# Patient Record
Sex: FEMALE | Race: WHITE | NOT HISPANIC OR LATINO | URBAN - METROPOLITAN AREA
[De-identification: names, ages, dates, MRNs, and addresses within clinical notes are randomized per-mention and may not be internally consistent; named-entity substitution may affect disease eponyms.]

---

## 2024-09-10 ENCOUNTER — OFFICE VISIT (OUTPATIENT)
Dept: URBAN - METROPOLITAN AREA CLINIC 107 | Facility: CLINIC | Age: 34
End: 2024-09-10

## 2024-09-10 NOTE — HPI-TODAY'S VISIT:
34-year-old who comes for evaluation of abdominal discomfort and blood in stool.  She was seen in the emergency room at [facility] on 7/30/2024 with lower abdominal pain rectal bleeding and some diarrhea.  She apparently had a similar episode 3 months prior.  Blood work on 7/30/2024 revealed a hemoglobin of 14, WBC of 9.4 and platelet count of 382,000.  Sodium 138 potassium 3.8 BUN 12 creatinine 0.85.  AST 33, ALT 21, alk phosphatase 52, total bili 0.7, albumin 5.0.  Lipase is 31.  Hepatitis C antibody negative.

## 2024-09-10 NOTE — HPI-OTHER HISTORIES
CT scan of abdomen and pelvis with IV contrast on 7/30/2024 revealed a normal pancreas and spleen, focal fatty infiltration of the falciform, prominent common bile duct gallbladder not well-visualized.  The bowels and appendix were unremarkable there is no adenopathy overall there was no acute findings.

## 2024-09-16 ENCOUNTER — OFFICE VISIT (OUTPATIENT)
Dept: URBAN - METROPOLITAN AREA CLINIC 113 | Facility: CLINIC | Age: 34
End: 2024-09-16

## 2024-09-17 ENCOUNTER — OFFICE VISIT (OUTPATIENT)
Dept: URBAN - METROPOLITAN AREA CLINIC 107 | Facility: CLINIC | Age: 34
End: 2024-09-17

## 2024-09-17 RX ORDER — ALPRAZOLAM 1 MG/1
TABLET ORAL
Qty: 15 TABLET | Status: ACTIVE | COMMUNITY

## 2024-09-17 RX ORDER — ALPRAZOLAM 1 MG/1
TAKE 1/2 TO 1 TABLET BY MOUTH TWICE A DAY AS NEEDED TABLET ORAL
Qty: 15 UNSPECIFIED | Refills: 0 | Status: ON HOLD | COMMUNITY

## 2024-09-17 NOTE — HPI-TODAY'S VISIT:
34-year-old who comes for evaluation of abdominal discomfort and blood in stool.  She was seen in the emergency room at [facility] on 7/30/2024 with lower abdominal pain rectal bleeding and some diarrhea.  She apparently had a similar episode 3 months prior.  Blood work on 7/30/2024 revealed a hemoglobin of 14, WBC of 9.4 and platelet count of 382,000.  Sodium 138 potassium 3.8 BUN 12 creatinine 0.85.  AST 33, ALT 21, alk phosphatase 52, total bili 0.7, albumin 5.0.  Lipase is 31.  Hepatitis C antibody negative.

## 2024-09-24 ENCOUNTER — LAB OUTSIDE AN ENCOUNTER (OUTPATIENT)
Dept: URBAN - METROPOLITAN AREA CLINIC 107 | Facility: CLINIC | Age: 34
End: 2024-09-24

## 2024-09-24 ENCOUNTER — OFFICE VISIT (OUTPATIENT)
Dept: URBAN - METROPOLITAN AREA CLINIC 107 | Facility: CLINIC | Age: 34
End: 2024-09-24
Payer: MEDICAID

## 2024-09-24 ENCOUNTER — DASHBOARD ENCOUNTERS (OUTPATIENT)
Age: 34
End: 2024-09-24

## 2024-09-24 VITALS
OXYGEN SATURATION: 99 % | BODY MASS INDEX: 24.54 KG/M2 | HEIGHT: 60 IN | TEMPERATURE: 98.1 F | DIASTOLIC BLOOD PRESSURE: 84 MMHG | SYSTOLIC BLOOD PRESSURE: 136 MMHG | WEIGHT: 125 LBS | HEART RATE: 94 BPM | RESPIRATION RATE: 18 BRPM

## 2024-09-24 DIAGNOSIS — R14.0 ABDOMINAL BLOATING: ICD-10-CM

## 2024-09-24 DIAGNOSIS — R93.2 ABNORMAL FINDINGS ON IMAGING OF BILIARY TRACT: ICD-10-CM

## 2024-09-24 DIAGNOSIS — K21.9 GASTROESOPHAGEAL REFLUX DISEASE, UNSPECIFIED WHETHER ESOPHAGITIS PRESENT: ICD-10-CM

## 2024-09-24 DIAGNOSIS — R19.4 ALTERED BOWEL HABITS: ICD-10-CM

## 2024-09-24 PROBLEM — 235595009: Status: ACTIVE | Noted: 2024-09-24

## 2024-09-24 PROCEDURE — 99204 OFFICE O/P NEW MOD 45 MIN: CPT | Performed by: NURSE PRACTITIONER

## 2024-09-24 RX ORDER — ALPRAZOLAM 1 MG/1
TAKE 1/2 TO 1 TABLET BY MOUTH TWICE A DAY AS NEEDED TABLET ORAL
Qty: 15 UNSPECIFIED | Refills: 0 | Status: ON HOLD | COMMUNITY

## 2024-09-24 RX ORDER — HYOSCYAMINE SULFATE 0.12 MG/1
1 TABLET UNDER THE TONGUE AND ALLOW TO DISSOLVE TABLET SUBLINGUAL
Qty: 90 | Refills: 1 | OUTPATIENT
Start: 2024-09-24 | End: 2024-11-22

## 2024-09-24 RX ORDER — OMEPRAZOLE 20 MG/1
1 CAPSULE 30 MINUTES BEFORE MORNING MEAL CAPSULE, DELAYED RELEASE ORAL ONCE A DAY
Qty: 30 | Refills: 1 | OUTPATIENT
Start: 2024-09-24

## 2024-09-24 RX ORDER — DEXTROAMPHETAMINE SACCHARATE, AMPHETAMINE ASPARTATE, DEXTROAMPHETAMINE SULFATE, AND AMPHETAMINE SULFATE 5; 5; 5; 5 MG/1; MG/1; MG/1; MG/1
1 TABLET TABLET ORAL TWICE A DAY
Status: ACTIVE | COMMUNITY

## 2024-09-24 RX ORDER — CRANBERRY CONC/C/BACILL COAG 250-30-15
AS DIRECTED TABLET ORAL
Status: ACTIVE | COMMUNITY

## 2024-09-24 RX ORDER — ALPRAZOLAM 1 MG/1
TABLET ORAL
Qty: 15 TABLET | Status: ACTIVE | COMMUNITY

## 2024-09-24 RX ORDER — SODIUM, POTASSIUM,MAG SULFATES 17.5-3.13G
AS DIRECTED SOLUTION, RECONSTITUTED, ORAL ORAL
Qty: 1 KIT | Refills: 0 | OUTPATIENT
Start: 2024-09-24

## 2024-09-24 NOTE — HPI-TODAY'S VISIT:
34-year-old referred by Larry Garcia PA-C, to Dr. Talbert for mucus in stool.   A copy of this note will be sent to the referring provider.   She was seen in the emergency room at [facility] on 7/30/2024 with lower abdominal pain rectal bleeding and some diarrhea.  She apparently had a similar episode 3 months prior.  Blood work on 7/30/2024 revealed a hemoglobin of 14, WBC of 9.4 and platelet count of 382,000.  Sodium 138 potassium 3.8 BUN 12 creatinine 0.85.  AST 33, ALT 21, ALK phosphatase 52, total bili 0.7, albumin 5.0.  Lipase is 31.  Hepatitis C antibody negative.  She reports she is currently having constipation and mucous in her stool occasional diarrhea. Occasional BRBPR. She drinks plenty of water and exercises.  No melena. Has abdominal bloating and GERD.  No nausea or vomiting.    LMP: s/p tubal  No prior colonoscopy

## 2024-10-23 ENCOUNTER — CLAIMS CREATED FROM THE CLAIM WINDOW (OUTPATIENT)
Dept: URBAN - METROPOLITAN AREA SURGERY CENTER 25 | Facility: SURGERY CENTER | Age: 34
End: 2024-10-23
Payer: MEDICAID

## 2024-10-23 ENCOUNTER — CLAIMS CREATED FROM THE CLAIM WINDOW (OUTPATIENT)
Dept: URBAN - METROPOLITAN AREA CLINIC 4 | Facility: CLINIC | Age: 34
End: 2024-10-23
Payer: MEDICAID

## 2024-10-23 ENCOUNTER — OFFICE VISIT (OUTPATIENT)
Dept: URBAN - METROPOLITAN AREA SURGERY CENTER 25 | Facility: SURGERY CENTER | Age: 34
End: 2024-10-23

## 2024-10-23 DIAGNOSIS — K64.0 FIRST DEGREE HEMORRHOIDS: ICD-10-CM

## 2024-10-23 DIAGNOSIS — K63.89 OTHER SPECIFIED DISEASES OF INTESTINE: ICD-10-CM

## 2024-10-23 DIAGNOSIS — R19.4 CHANGE IN BOWEL HABIT: ICD-10-CM

## 2024-10-23 DIAGNOSIS — R10.30 LOWER ABDOMINAL PAIN: ICD-10-CM

## 2024-10-23 PROCEDURE — 88305 TISSUE EXAM BY PATHOLOGIST: CPT | Performed by: PATHOLOGY

## 2024-10-23 PROCEDURE — 00811 ANES LWR INTST NDSC NOS: CPT | Performed by: ANESTHESIOLOGY

## 2024-10-23 RX ORDER — CRANBERRY CONC/C/BACILL COAG 250-30-15
AS DIRECTED TABLET ORAL
Status: ACTIVE | COMMUNITY

## 2024-10-23 RX ORDER — ALPRAZOLAM 1 MG/1
TABLET ORAL
Qty: 15 TABLET | Status: ACTIVE | COMMUNITY

## 2024-10-23 RX ORDER — OMEPRAZOLE 20 MG/1
1 CAPSULE 30 MINUTES BEFORE MORNING MEAL CAPSULE, DELAYED RELEASE ORAL ONCE A DAY
Qty: 30 | Refills: 1 | Status: ACTIVE | COMMUNITY
Start: 2024-09-24

## 2024-10-23 RX ORDER — SODIUM, POTASSIUM,MAG SULFATES 17.5-3.13G
AS DIRECTED SOLUTION, RECONSTITUTED, ORAL ORAL
Qty: 1 KIT | Refills: 0 | Status: ACTIVE | COMMUNITY
Start: 2024-09-24

## 2024-10-23 RX ORDER — HYOSCYAMINE SULFATE 0.12 MG/1
1 TABLET UNDER THE TONGUE AND ALLOW TO DISSOLVE TABLET SUBLINGUAL
Qty: 90 | Refills: 1 | Status: ACTIVE | COMMUNITY
Start: 2024-09-24 | End: 2024-11-22

## 2024-10-23 RX ORDER — ALPRAZOLAM 1 MG/1
TAKE 1/2 TO 1 TABLET BY MOUTH TWICE A DAY AS NEEDED TABLET ORAL
Qty: 15 UNSPECIFIED | Refills: 0 | Status: ON HOLD | COMMUNITY

## 2024-10-23 RX ORDER — DEXTROAMPHETAMINE SACCHARATE, AMPHETAMINE ASPARTATE, DEXTROAMPHETAMINE SULFATE, AND AMPHETAMINE SULFATE 5; 5; 5; 5 MG/1; MG/1; MG/1; MG/1
1 TABLET TABLET ORAL TWICE A DAY
Status: ACTIVE | COMMUNITY

## 2024-11-06 ENCOUNTER — OFFICE VISIT (OUTPATIENT)
Dept: URBAN - METROPOLITAN AREA CLINIC 107 | Facility: CLINIC | Age: 34
End: 2024-11-06

## 2024-11-20 ENCOUNTER — OFFICE VISIT (OUTPATIENT)
Dept: URBAN - METROPOLITAN AREA CLINIC 107 | Facility: CLINIC | Age: 34
End: 2024-11-20

## 2024-11-20 RX ORDER — SODIUM, POTASSIUM,MAG SULFATES 17.5-3.13G
AS DIRECTED SOLUTION, RECONSTITUTED, ORAL ORAL
Qty: 1 KIT | Refills: 0 | Status: ACTIVE | COMMUNITY
Start: 2024-09-24

## 2024-11-20 RX ORDER — ALPRAZOLAM 1 MG/1
TABLET ORAL
Qty: 15 TABLET | Status: ACTIVE | COMMUNITY

## 2024-11-20 RX ORDER — HYOSCYAMINE SULFATE 0.12 MG/1
1 TABLET UNDER THE TONGUE AND ALLOW TO DISSOLVE TABLET SUBLINGUAL
Qty: 90 | Refills: 1 | Status: ACTIVE | COMMUNITY
Start: 2024-09-24 | End: 2024-11-22

## 2024-11-20 RX ORDER — CRANBERRY CONC/C/BACILL COAG 250-30-15
AS DIRECTED TABLET ORAL
Status: ACTIVE | COMMUNITY

## 2024-11-20 RX ORDER — OMEPRAZOLE 20 MG/1
1 CAPSULE 30 MINUTES BEFORE MORNING MEAL CAPSULE, DELAYED RELEASE ORAL ONCE A DAY
Qty: 30 | Refills: 1 | Status: ACTIVE | COMMUNITY
Start: 2024-09-24

## 2024-11-20 RX ORDER — DEXTROAMPHETAMINE SACCHARATE, AMPHETAMINE ASPARTATE, DEXTROAMPHETAMINE SULFATE, AND AMPHETAMINE SULFATE 5; 5; 5; 5 MG/1; MG/1; MG/1; MG/1
1 TABLET TABLET ORAL TWICE A DAY
Status: ACTIVE | COMMUNITY

## 2024-11-20 RX ORDER — ALPRAZOLAM 1 MG/1
TAKE 1/2 TO 1 TABLET BY MOUTH TWICE A DAY AS NEEDED TABLET ORAL
Qty: 15 UNSPECIFIED | Refills: 0 | Status: ON HOLD | COMMUNITY

## 2024-11-20 NOTE — HPI-TODAY'S VISIT:
34-year-old referred by Larry Garcia PA-C, to Dr. Talbert for mucus in stool.   A copy of this note will be sent to the referring provider.   She was seen in the emergency room at [facility] on 7/30/2024 with lower abdominal pain rectal bleeding and some diarrhea.  She apparently had a similar episode 3 months prior.  Blood work on 7/30/2024 revealed a hemoglobin of 14, WBC of 9.4 and platelet count of 382,000.  Sodium 138 potassium 3.8 BUN 12 creatinine 0.85.  AST 33, ALT 21, ALK phosphatase 52, total bili 0.7, albumin 5.0.  Lipase is 31.  Hepatitis C antibody negative.  She reports she is currently having constipation and mucous in her stool occasional diarrhea. Occasional BRBPR. She drinks plenty of water and exercises.  No melena. Has abdominal bloating and GERD.  No nausea or vomiting.    LMP: s/p tubal  No prior colonoscopy Last seen 9/24/2024 for altered bowel habits, generalized abdominal pain, possible dilatation of common bile duct, bloating and GERD.  Right upper quadrant ultrasound, colonoscopy and H. pylori breath test ordered for further evaluation.  Start omeprazole 20 mg daily after breath test submitted.  Can try Gas-X or IBgard for bloating. Interval history 11/20/2024 Colonoscopy 10/23/2024 revealed grade 1 internal hemorrhoids otherwise normal.  Random colon biopsy normal. **I do not see H. pylori breath test or right upper quadrant ultrasound results.